# Patient Record
Sex: FEMALE | URBAN - METROPOLITAN AREA
[De-identification: names, ages, dates, MRNs, and addresses within clinical notes are randomized per-mention and may not be internally consistent; named-entity substitution may affect disease eponyms.]

---

## 2020-07-01 ENCOUNTER — CLAIMS CREATED FROM THE CLAIM WINDOW (OUTPATIENT)
Dept: URBAN - METROPOLITAN AREA CLINIC 4 | Facility: CLINIC | Age: 70
End: 2020-07-01
Payer: MEDICARE

## 2020-07-01 ENCOUNTER — OFFICE VISIT (OUTPATIENT)
Dept: URBAN - METROPOLITAN AREA SURGERY CENTER 25 | Facility: SURGERY CENTER | Age: 70
End: 2020-07-01

## 2020-07-01 DIAGNOSIS — D12.5 BENIGN NEOPLASM OF SIGMOID COLON: ICD-10-CM

## 2020-07-01 PROCEDURE — 88305 TISSUE EXAM BY PATHOLOGIST: CPT | Performed by: PATHOLOGY

## 2020-07-25 ENCOUNTER — TELEPHONE ENCOUNTER (OUTPATIENT)
Dept: URBAN - METROPOLITAN AREA CLINIC 13 | Facility: CLINIC | Age: 70
End: 2020-07-25

## 2020-07-25 RX ORDER — POLYETHYLENE GLYCOL 3350, SODIUM CHLORIDE, SODIUM BICARBONATE AND POTASSIUM CHLORIDE WITH LEMON FLAVOR 420; 11.2; 5.72; 1.48 G/4L; G/4L; G/4L; G/4L
MIX CONTENTS PER PKG DIRECTIONS. DAY PRIOR TO PROCEDURE BEGIN DRINKING 1/2 SOULTION @ 5PM, COMPLETE  SECOND 1/2 6HR PRIOR TO PROCEDURE POWDER, FOR SOLUTION ORAL
Qty: 1 | Refills: 0 | OUTPATIENT
Start: 2020-05-27 | End: 2020-07-01

## 2020-07-26 ENCOUNTER — TELEPHONE ENCOUNTER (OUTPATIENT)
Dept: URBAN - METROPOLITAN AREA CLINIC 13 | Facility: CLINIC | Age: 70
End: 2020-07-26

## 2020-07-26 RX ORDER — PRAVASTATIN SODIUM 20 MG/1
TAKE 1 TABLET DAILY TABLET ORAL
Refills: 0 | Status: ACTIVE | COMMUNITY

## 2020-07-26 RX ORDER — TOPIRAMATE 50 MG/1
TABLET, FILM COATED ORAL
Qty: 180 | Refills: 0 | Status: ACTIVE | COMMUNITY
Start: 2019-09-27

## 2020-07-26 RX ORDER — AMOXICILLIN AND CLAVULANATE POTASSIUM 875; 125 MG/1; MG/1
TABLET, FILM COATED ORAL
Qty: 14 | Refills: 0 | Status: ACTIVE | COMMUNITY
Start: 2019-12-10

## 2020-07-26 RX ORDER — SUMATRIPTAN SUCCINATE 50 MG/1
TAKE 1 TABLET AT ONSET OF MIGRAINE HEADACHE.  MAY REPEAT IN 2 HOURS IF NEEDED TABLET, FILM COATED ORAL
Refills: 0 | Status: ACTIVE | COMMUNITY

## 2020-07-26 RX ORDER — SERTRALINE 25 MG/1
TAKE 1 TABLET DAILY AS DIRECTED TABLET, FILM COATED ORAL
Refills: 0 | Status: ACTIVE | COMMUNITY
Start: 2020-06-23

## 2020-07-26 RX ORDER — BUPROPION HCL 300 MG
TAKE 1 TABLET DAILY TABLET, EXTENDED RELEASE 24 HR ORAL
Refills: 0 | Status: ACTIVE | COMMUNITY

## 2020-07-26 RX ORDER — TOPIRAMATE 50 MG/1
TABLET, FILM COATED ORAL
Qty: 180 | Refills: 0 | Status: ACTIVE | COMMUNITY
Start: 2019-03-11

## 2020-07-26 RX ORDER — ERYTHROMYCIN 5 MG/G
OINTMENT OPHTHALMIC
Qty: 4 | Refills: 0 | Status: ACTIVE | COMMUNITY
Start: 2019-11-30

## 2020-07-26 RX ORDER — ERENUMAB-AOOE 70 MG/ML
INJECTION SUBCUTANEOUS
Qty: 1 | Refills: 0 | Status: ACTIVE | COMMUNITY
Start: 2020-06-30

## 2020-07-26 RX ORDER — ERYTHROMYCIN 5 MG/G
OINTMENT OPHTHALMIC
Qty: 4 | Refills: 0 | Status: ACTIVE | COMMUNITY
Start: 2019-12-03

## 2020-07-26 RX ORDER — TRAZODONE HYDROCHLORIDE 100 MG/1
TAKE 1 TABLET DAILY AS DIRECTED TABLET, FILM COATED ORAL
Refills: 0 | Status: ACTIVE | COMMUNITY
Start: 2020-05-13

## 2020-07-26 RX ORDER — BUPROPION HYDROCHLORIDE 150 MG/1
TAKE 1 TABLET DAILY TABLET, FILM COATED, EXTENDED RELEASE ORAL
Refills: 0 | Status: ACTIVE | COMMUNITY
Start: 2020-05-13

## 2020-07-26 RX ORDER — FLUTICASONE PROPIONATE 50 UG/1
SPRAY, METERED NASAL
Qty: 16 | Refills: 0 | Status: ACTIVE | COMMUNITY
Start: 2019-12-10

## 2020-07-26 RX ORDER — TOPIRAMATE 25 MG/1
TAKE 1 TABLET BEDTIME TABLET, COATED ORAL
Refills: 0 | Status: ACTIVE | COMMUNITY

## 2020-07-26 RX ORDER — ESTRADIOL 0.1 MG/G
PLACE 2 GRAMS VAGINALLY D CREAM VAGINAL
Qty: 42 | Refills: 0 | Status: ACTIVE | COMMUNITY
Start: 2020-07-09

## 2020-07-26 RX ORDER — BENZONATATE 100 MG/1
CAPSULE ORAL
Qty: 30 | Refills: 0 | Status: ACTIVE | COMMUNITY
Start: 2019-12-10

## 2020-07-26 RX ORDER — SERTRALINE 100 MG/1
TABLET, FILM COATED ORAL
Qty: 180 | Refills: 0 | Status: ACTIVE | COMMUNITY
Start: 2020-02-11

## 2020-07-26 RX ORDER — SERTRALINE 100 MG/1
TABLET, FILM COATED ORAL
Qty: 180 | Refills: 0 | Status: ACTIVE | COMMUNITY
Start: 2019-08-16

## 2020-07-29 ENCOUNTER — OFFICE VISIT (OUTPATIENT)
Dept: URBAN - METROPOLITAN AREA CLINIC 113 | Facility: CLINIC | Age: 70
End: 2020-07-29

## 2021-10-11 ENCOUNTER — OFFICE VISIT (OUTPATIENT)
Dept: URBAN - METROPOLITAN AREA CLINIC 113 | Facility: CLINIC | Age: 71
End: 2021-10-11
Payer: MEDICARE

## 2021-10-11 VITALS
DIASTOLIC BLOOD PRESSURE: 65 MMHG | SYSTOLIC BLOOD PRESSURE: 102 MMHG | RESPIRATION RATE: 20 BRPM | HEIGHT: 66 IN | HEART RATE: 70 BPM | TEMPERATURE: 98.2 F | BODY MASS INDEX: 18.16 KG/M2 | WEIGHT: 113 LBS

## 2021-10-11 DIAGNOSIS — K86.2 PANCREATIC CYST: ICD-10-CM

## 2021-10-11 DIAGNOSIS — K86.89 PANCREATIC DUCT DILATED: ICD-10-CM

## 2021-10-11 DIAGNOSIS — K59.01 SLOW TRANSIT CONSTIPATION: ICD-10-CM

## 2021-10-11 DIAGNOSIS — R10.13 EPIGASTRIC ABDOMINAL PAIN: ICD-10-CM

## 2021-10-11 DIAGNOSIS — R63.4 WEIGHT LOSS, UNINTENTIONAL: ICD-10-CM

## 2021-10-11 PROCEDURE — 99215 OFFICE O/P EST HI 40 MIN: CPT | Performed by: NURSE PRACTITIONER

## 2021-10-11 RX ORDER — BUPROPION HYDROCHLORIDE 150 MG/1
TAKE 1 TABLET DAILY TABLET, FILM COATED, EXTENDED RELEASE ORAL
Refills: 0 | Status: ON HOLD | COMMUNITY
Start: 2020-05-13

## 2021-10-11 RX ORDER — TOPIRAMATE 25 MG/1
TAKE 1 TABLET BEDTIME TABLET, COATED ORAL
Refills: 0 | Status: ACTIVE | COMMUNITY

## 2021-10-11 RX ORDER — ERENUMAB-AOOE 70 MG/ML
INJECTION SUBCUTANEOUS
Qty: 1 | Refills: 0 | Status: ON HOLD | COMMUNITY
Start: 2020-06-30

## 2021-10-11 RX ORDER — AMOXICILLIN AND CLAVULANATE POTASSIUM 875; 125 MG/1; MG/1
TABLET, FILM COATED ORAL
Qty: 14 | Refills: 0 | Status: ON HOLD | COMMUNITY
Start: 2019-12-10

## 2021-10-11 RX ORDER — SUMATRIPTAN SUCCINATE 50 MG/1
TAKE 1 TABLET AT ONSET OF MIGRAINE HEADACHE.  MAY REPEAT IN 2 HOURS IF NEEDED TABLET, FILM COATED ORAL
Refills: 0 | Status: ACTIVE | COMMUNITY

## 2021-10-11 RX ORDER — BENZONATATE 100 MG/1
CAPSULE ORAL
Qty: 30 | Refills: 0 | Status: ON HOLD | COMMUNITY
Start: 2019-12-10

## 2021-10-11 RX ORDER — PRAVASTATIN SODIUM 20 MG/1
TAKE 1 TABLET DAILY TABLET ORAL
Refills: 0 | Status: ACTIVE | COMMUNITY

## 2021-10-11 RX ORDER — FLUTICASONE PROPIONATE 50 UG/1
SPRAY, METERED NASAL
Qty: 16 | Refills: 0 | Status: ACTIVE | COMMUNITY
Start: 2019-12-10

## 2021-10-11 RX ORDER — TOPIRAMATE 100 MG/1
1 TABLET TABLET, FILM COATED ORAL ONCE A DAY
Refills: 0 | Status: ACTIVE | COMMUNITY
Start: 2019-03-11

## 2021-10-11 RX ORDER — TRAZODONE HYDROCHLORIDE 100 MG/1
TAKE 1 TABLET DAILY AS DIRECTED TABLET, FILM COATED ORAL
Refills: 0 | Status: ACTIVE | COMMUNITY
Start: 2020-05-13

## 2021-10-11 RX ORDER — SERTRALINE 25 MG/1
TAKE 1 TABLET DAILY AS DIRECTED TABLET, FILM COATED ORAL
Refills: 0 | Status: ON HOLD | COMMUNITY
Start: 2020-06-23

## 2021-10-11 RX ORDER — ESTRADIOL 0.1 MG/G
PLACE 2 GRAMS VAGINALLY D CREAM VAGINAL
Qty: 42 | Refills: 0 | Status: ON HOLD | COMMUNITY
Start: 2020-07-09

## 2021-10-11 RX ORDER — ERYTHROMYCIN 5 MG/G
OINTMENT OPHTHALMIC
Qty: 4 | Refills: 0 | Status: ON HOLD | COMMUNITY
Start: 2019-11-30

## 2021-10-11 RX ORDER — DENOSUMAB 60 MG/ML
AS DIRECTED INJECTION SUBCUTANEOUS
Status: ACTIVE | COMMUNITY

## 2021-10-11 RX ORDER — BUPROPION HCL 300 MG
TAKE 1 TABLET DAILY TABLET, EXTENDED RELEASE 24 HR ORAL
Refills: 0 | Status: ACTIVE | COMMUNITY

## 2021-10-11 RX ORDER — SERTRALINE 100 MG/1
TABLET, FILM COATED ORAL
Qty: 180 | Refills: 0 | Status: ON HOLD | COMMUNITY
Start: 2019-08-16

## 2021-10-11 NOTE — HPI-OTHER HISTORIES
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 Mild dilatation of the main pancreatic duct is stable. Labs 6/3/2021:Normal CBC, CMP, amylase, lipase, ferritin, iron sat.  Negative hepatitis A antibody IgM, hepatitis B surface antigen, hepatitis B core antibody IgM, hepatitis C antibody.  Negative serum H. pylori. CT of the abdomen and pelvis with contrast 5/28/2021:Circumferential bladder wall thickening, a 5 mm cystic lesion within the uncinate process which most likely represents a sidebranch IPMN.  The main pancreatic duct measures up to 7 mm. Colonoscopy 7/1/2020:Diverticulosis in the sigmoid and descending colon and a 7 mm hyperplastic polyp at the rectosigmoid colon.

## 2021-10-11 NOTE — HPI-TODAY'S VISIT:
71-year-old female with a history of hyperlipidemia, anxiety and depression, presenting for evaluation of abdominal pain, weight loss, and abnormal imaging findings.  She reports a 4-6 month history of intermittent, sharp postprandial epigastric pain. She complains of associated nausea without vomiting. Her reflux is controlled with low-dose acid suppression, but she cannot recall the name of the medication. She has constipation at baseline, going up to 5 days without a stool. She has tried prunes and occasional use of MiraLAX, without change. She reports a 20lb unintentional weight loss within the last 6-12 months, with associated early satiety. She had labs and imaging with her PCP, as outlined below. She reports infrequent NSAID use and occasional alcohol use, but denies tobacco use.  There is no family history of GI malignancy.

## 2021-10-12 PROBLEM — 79922009 EPIGASTRIC PAIN: Status: ACTIVE | Noted: 2021-10-12

## 2021-10-12 PROBLEM — 235493004 DILATATION OF PANCREATIC DUCT: Status: ACTIVE | Noted: 2021-10-12

## 2021-10-12 LAB
A/G RATIO: 2.4
ALBUMIN: 4.5
ALKALINE PHOSPHATASE: 35
ALT (SGPT): 16
AST (SGOT): 17
BASO (ABSOLUTE): 0
BASOS: 1
BILIRUBIN, TOTAL: 0.4
BUN/CREATININE RATIO: 22
BUN: 19
CA 19-9: 10
CALCIUM: 9.2
CARBON DIOXIDE, TOTAL: 23
CHLORIDE: 110
CREATININE: 0.86
EGFR IF AFRICN AM: 79
EGFR IF NONAFRICN AM: 69
EOS (ABSOLUTE): 0.1
EOS: 3
GLOBULIN, TOTAL: 1.9
GLUCOSE: 94
HEMATOCRIT: 41.8
HEMATOLOGY COMMENTS:: (no result)
HEMOGLOBIN: 13.2
IMMATURE CELLS: (no result)
IMMATURE GRANS (ABS): 0
IMMATURE GRANULOCYTES: 0
LYMPHS (ABSOLUTE): 1.1
LYMPHS: 35
MCH: 28.8
MCHC: 31.6
MCV: 91
MONOCYTES(ABSOLUTE): 0.3
MONOCYTES: 8
NEUTROPHILS (ABSOLUTE): 1.7
NEUTROPHILS: 53
NRBC: (no result)
PLATELETS: 167
POTASSIUM: 4.1
PROTEIN, TOTAL: 6.4
RBC: 4.59
RDW: 12.4
SODIUM: 145
WBC: 3.3

## 2021-11-10 ENCOUNTER — OFFICE VISIT (OUTPATIENT)
Dept: URBAN - METROPOLITAN AREA MEDICAL CENTER 19 | Facility: MEDICAL CENTER | Age: 71
End: 2021-11-10
Payer: MEDICARE

## 2021-11-10 ENCOUNTER — LAB OUTSIDE AN ENCOUNTER (OUTPATIENT)
Dept: URBAN - METROPOLITAN AREA CLINIC 113 | Facility: CLINIC | Age: 71
End: 2021-11-10

## 2021-11-10 ENCOUNTER — TELEPHONE ENCOUNTER (OUTPATIENT)
Dept: URBAN - METROPOLITAN AREA CLINIC 113 | Facility: CLINIC | Age: 71
End: 2021-11-10

## 2021-11-10 DIAGNOSIS — K44.9 DIAPHRAGMATIC HERNIA WITHOUT OBSTRUCTION AND WITHOUT GANGRENE: ICD-10-CM

## 2021-11-10 DIAGNOSIS — R93.3 ABNORMAL BARIUM ENEMA: ICD-10-CM

## 2021-11-10 DIAGNOSIS — K22.89 ESOPHAGEAL THICKENING: ICD-10-CM

## 2021-11-10 DIAGNOSIS — K86.89 ATROPHY, PANCREAS: ICD-10-CM

## 2021-11-10 DIAGNOSIS — K86.2 CYST OF PANCREAS: ICD-10-CM

## 2021-11-10 PROCEDURE — 43259 EGD US EXAM DUODENUM/JEJUNUM: CPT | Performed by: INTERNAL MEDICINE

## 2021-11-10 PROCEDURE — 43239 EGD BIOPSY SINGLE/MULTIPLE: CPT | Performed by: INTERNAL MEDICINE

## 2021-11-10 RX ORDER — TRAZODONE HYDROCHLORIDE 100 MG/1
TAKE 1 TABLET DAILY AS DIRECTED TABLET, FILM COATED ORAL
Refills: 0 | Status: ACTIVE | COMMUNITY
Start: 2020-05-13

## 2021-11-10 RX ORDER — ERYTHROMYCIN 5 MG/G
OINTMENT OPHTHALMIC
Qty: 4 | Refills: 0 | Status: ON HOLD | COMMUNITY
Start: 2019-11-30

## 2021-11-10 RX ORDER — PRAVASTATIN SODIUM 20 MG/1
TAKE 1 TABLET DAILY TABLET ORAL
Refills: 0 | Status: ACTIVE | COMMUNITY

## 2021-11-10 RX ORDER — BUPROPION HYDROCHLORIDE 150 MG/1
TAKE 1 TABLET DAILY TABLET, FILM COATED, EXTENDED RELEASE ORAL
Refills: 0 | Status: ON HOLD | COMMUNITY
Start: 2020-05-13

## 2021-11-10 RX ORDER — TOPIRAMATE 25 MG/1
TAKE 1 TABLET BEDTIME TABLET, COATED ORAL
Refills: 0 | Status: ACTIVE | COMMUNITY

## 2021-11-10 RX ORDER — FLUTICASONE PROPIONATE 50 UG/1
SPRAY, METERED NASAL
Qty: 16 | Refills: 0 | Status: ACTIVE | COMMUNITY
Start: 2019-12-10

## 2021-11-10 RX ORDER — BENZONATATE 100 MG/1
CAPSULE ORAL
Qty: 30 | Refills: 0 | Status: ON HOLD | COMMUNITY
Start: 2019-12-10

## 2021-11-10 RX ORDER — BUPROPION HCL 300 MG
TAKE 1 TABLET DAILY TABLET, EXTENDED RELEASE 24 HR ORAL
Refills: 0 | Status: ACTIVE | COMMUNITY

## 2021-11-10 RX ORDER — AMOXICILLIN AND CLAVULANATE POTASSIUM 875; 125 MG/1; MG/1
TABLET, FILM COATED ORAL
Qty: 14 | Refills: 0 | Status: ON HOLD | COMMUNITY
Start: 2019-12-10

## 2021-11-10 RX ORDER — ERENUMAB-AOOE 70 MG/ML
INJECTION SUBCUTANEOUS
Qty: 1 | Refills: 0 | Status: ON HOLD | COMMUNITY
Start: 2020-06-30

## 2021-11-10 RX ORDER — TOPIRAMATE 100 MG/1
1 TABLET TABLET, FILM COATED ORAL ONCE A DAY
Refills: 0 | Status: ACTIVE | COMMUNITY
Start: 2019-03-11

## 2021-11-10 RX ORDER — SUMATRIPTAN SUCCINATE 50 MG/1
TAKE 1 TABLET AT ONSET OF MIGRAINE HEADACHE.  MAY REPEAT IN 2 HOURS IF NEEDED TABLET, FILM COATED ORAL
Refills: 0 | Status: ACTIVE | COMMUNITY

## 2021-11-10 RX ORDER — DENOSUMAB 60 MG/ML
AS DIRECTED INJECTION SUBCUTANEOUS
Status: ACTIVE | COMMUNITY

## 2021-11-10 RX ORDER — ESTRADIOL 0.1 MG/G
PLACE 2 GRAMS VAGINALLY D CREAM VAGINAL
Qty: 42 | Refills: 0 | Status: ON HOLD | COMMUNITY
Start: 2020-07-09

## 2021-11-10 RX ORDER — SERTRALINE 100 MG/1
TABLET, FILM COATED ORAL
Qty: 180 | Refills: 0 | Status: ON HOLD | COMMUNITY
Start: 2019-08-16

## 2021-11-10 RX ORDER — SERTRALINE 25 MG/1
TAKE 1 TABLET DAILY AS DIRECTED TABLET, FILM COATED ORAL
Refills: 0 | Status: ON HOLD | COMMUNITY
Start: 2020-06-23

## 2021-12-02 ENCOUNTER — OFFICE VISIT (OUTPATIENT)
Dept: URBAN - METROPOLITAN AREA CLINIC 113 | Facility: CLINIC | Age: 71
End: 2021-12-02
Payer: MEDICARE

## 2021-12-02 ENCOUNTER — DASHBOARD ENCOUNTERS (OUTPATIENT)
Age: 71
End: 2021-12-02

## 2021-12-02 VITALS
TEMPERATURE: 98.6 F | DIASTOLIC BLOOD PRESSURE: 65 MMHG | WEIGHT: 114 LBS | SYSTOLIC BLOOD PRESSURE: 102 MMHG | HEIGHT: 66 IN | BODY MASS INDEX: 18.32 KG/M2 | RESPIRATION RATE: 18 BRPM | HEART RATE: 72 BPM

## 2021-12-02 DIAGNOSIS — K86.2 PANCREATIC CYST: ICD-10-CM

## 2021-12-02 DIAGNOSIS — K62.89 PROCTALGIA: ICD-10-CM

## 2021-12-02 DIAGNOSIS — K59.01 SLOW TRANSIT CONSTIPATION: ICD-10-CM

## 2021-12-02 DIAGNOSIS — R63.4 WEIGHT LOSS, UNINTENTIONAL: ICD-10-CM

## 2021-12-02 PROBLEM — 35298007 SLOW TRANSIT CONSTIPATION: Status: ACTIVE | Noted: 2021-10-11

## 2021-12-02 PROBLEM — 262285001 WEIGHT DECREASED: Status: ACTIVE | Noted: 2021-10-12

## 2021-12-02 PROCEDURE — 99214 OFFICE O/P EST MOD 30 MIN: CPT | Performed by: NURSE PRACTITIONER

## 2021-12-02 RX ORDER — TOPIRAMATE 100 MG/1
1 TABLET TABLET, FILM COATED ORAL ONCE A DAY
Refills: 0 | Status: ACTIVE | COMMUNITY
Start: 2019-03-11

## 2021-12-02 RX ORDER — DENOSUMAB 60 MG/ML
AS DIRECTED INJECTION SUBCUTANEOUS
Status: ACTIVE | COMMUNITY

## 2021-12-02 RX ORDER — SERTRALINE 100 MG/1
TABLET, FILM COATED ORAL
Qty: 180 | Refills: 0 | Status: ON HOLD | COMMUNITY
Start: 2019-08-16

## 2021-12-02 RX ORDER — ESTRADIOL 0.1 MG/G
PLACE 2 GRAMS VAGINALLY D CREAM VAGINAL
Qty: 42 | Refills: 0 | Status: ON HOLD | COMMUNITY
Start: 2020-07-09

## 2021-12-02 RX ORDER — FLUTICASONE PROPIONATE 50 UG/1
SPRAY, METERED NASAL
Qty: 16 | Refills: 0 | Status: ACTIVE | COMMUNITY
Start: 2019-12-10

## 2021-12-02 RX ORDER — BUPROPION HYDROCHLORIDE 150 MG/1
TAKE 1 TABLET DAILY TABLET, FILM COATED, EXTENDED RELEASE ORAL
Refills: 0 | Status: ON HOLD | COMMUNITY
Start: 2020-05-13

## 2021-12-02 RX ORDER — ERYTHROMYCIN 5 MG/G
OINTMENT OPHTHALMIC
Qty: 4 | Refills: 0 | Status: ON HOLD | COMMUNITY
Start: 2019-11-30

## 2021-12-02 RX ORDER — TOPIRAMATE 25 MG/1
TAKE 1 TABLET BEDTIME TABLET, COATED ORAL
Refills: 0 | Status: ACTIVE | COMMUNITY

## 2021-12-02 RX ORDER — SERTRALINE 25 MG/1
TAKE 1 TABLET DAILY AS DIRECTED TABLET, FILM COATED ORAL
Refills: 0 | Status: ON HOLD | COMMUNITY
Start: 2020-06-23

## 2021-12-02 RX ORDER — BUPROPION HCL 300 MG
TAKE 1 TABLET DAILY TABLET, EXTENDED RELEASE 24 HR ORAL
Refills: 0 | Status: ACTIVE | COMMUNITY

## 2021-12-02 RX ORDER — PRAVASTATIN SODIUM 20 MG/1
TAKE 1 TABLET DAILY TABLET ORAL
Refills: 0 | Status: ACTIVE | COMMUNITY

## 2021-12-02 RX ORDER — TRAZODONE HYDROCHLORIDE 100 MG/1
TAKE 1 TABLET DAILY AS DIRECTED TABLET, FILM COATED ORAL
Refills: 0 | Status: ACTIVE | COMMUNITY
Start: 2020-05-13

## 2021-12-02 RX ORDER — SUMATRIPTAN SUCCINATE 50 MG/1
TAKE 1 TABLET AT ONSET OF MIGRAINE HEADACHE.  MAY REPEAT IN 2 HOURS IF NEEDED TABLET, FILM COATED ORAL
Refills: 0 | Status: ACTIVE | COMMUNITY

## 2021-12-02 RX ORDER — ERENUMAB-AOOE 70 MG/ML
INJECTION SUBCUTANEOUS
Qty: 1 | Refills: 0 | Status: ON HOLD | COMMUNITY
Start: 2020-06-30

## 2021-12-02 RX ORDER — HYDROCORTISONE 25 MG/G
1 APPLICATION CREAM TOPICAL TWICE A DAY
Qty: 28 GRAMS | Refills: 1 | OUTPATIENT
Start: 2021-12-02 | End: 2022-01-31

## 2021-12-02 RX ORDER — LINACLOTIDE 72 UG/1
1 CAPSULE AT LEAST 30 MINUTES BEFORE THE FIRST MEAL OF THE DAY ON AN EMPTY STOMACH CAPSULE, GELATIN COATED ORAL ONCE A DAY
Qty: 90 | Refills: 3 | OUTPATIENT
Start: 2021-12-02 | End: 2022-11-27

## 2021-12-02 RX ORDER — AMOXICILLIN AND CLAVULANATE POTASSIUM 875; 125 MG/1; MG/1
TABLET, FILM COATED ORAL
Qty: 14 | Refills: 0 | Status: ON HOLD | COMMUNITY
Start: 2019-12-10

## 2021-12-02 RX ORDER — BENZONATATE 100 MG/1
CAPSULE ORAL
Qty: 30 | Refills: 0 | Status: ON HOLD | COMMUNITY
Start: 2019-12-10

## 2021-12-02 NOTE — HPI-TODAY'S VISIT:
71-year-old female with a history of hyperlipidemia, anxiety and depression, presenting for follow-up after EUS.  She was seen in the office 10/11/21 for evaluation of postprandial epigastric pain, early satiety, and unintentional weight loss, with CT and MRI/MRCP revealing a pancreatic cyst and mild dilatation of the pancreatic duct,  suspected to reflect a sidebranch IPMN. Labs and an EGD with EUS was planned. Regarding GERD, she was to continue her current regimen with PPI. She was recommended a daily bowel regimen with MiraLAX and MOM for constipation.  EUS 11/10/21: a 6mm pancreatic duct within the pancreatic head, tiny 3mm pancreatic head cyst with probable duct communication c/w IPMN, normal pancreatic duct, small hiatal hernia, irregular z-line at 40cm (biopsies negative), patulous lower esophageal sphincter. A CT of the chest was recommended to further evaluate her weight loss, with plan for repeat MRI/MRCP in January then every 6 months thereafter for surveillance.  Fortunately, her abdominal pain and nausea have subsided. Her appetite has improved and she is able to eat small meals. Her weight has increased 1lb since the time of her last visit. She did not proceed with a CT of the chest, because she was unclear of the reasoning for the additional imaging. She was not able to tolerate nutritional shakes. She continues to struggle with constipation, but admits she has been inconsistent with MiraLAX. She is only having a bowel movement every 5-6 days. She awoke this morning with rectal pain and a protrusion to the rectal area, concerning for a hemorrhoid. She denies rectal bleeding. Of note, milk of magnesia was effective but did result in diarrhea.

## 2022-01-03 ENCOUNTER — TELEPHONE ENCOUNTER (OUTPATIENT)
Dept: URBAN - METROPOLITAN AREA CLINIC 113 | Facility: CLINIC | Age: 72
End: 2022-01-03

## 2022-01-04 PROBLEM — 31258000 PANCREATIC CYST: Status: ACTIVE | Noted: 2021-10-12

## 2024-08-21 ENCOUNTER — LAB OUTSIDE AN ENCOUNTER (OUTPATIENT)
Dept: URBAN - METROPOLITAN AREA SURGERY CENTER 25 | Facility: SURGERY CENTER | Age: 74
End: 2024-08-21

## 2024-08-22 ENCOUNTER — OFFICE VISIT (OUTPATIENT)
Dept: URBAN - METROPOLITAN AREA SURGERY CENTER 25 | Facility: SURGERY CENTER | Age: 74
End: 2024-08-22
Payer: MEDICARE

## 2024-08-22 DIAGNOSIS — Z86.010 PERSONAL HISTORY OF COLONIC POLYPS: ICD-10-CM

## 2024-08-22 DIAGNOSIS — Z09 ENCNTR FOR F/U EXAM AFT TRTMT FOR COND OTH THAN MALIG NEOPLM: ICD-10-CM

## 2024-08-22 DIAGNOSIS — K57.30 COLON, DIVERTICULOSIS: ICD-10-CM

## 2024-08-22 DIAGNOSIS — Z09 ENCOUNTER FOR FOLLOW-UP EXAMINATION AFTER COMPLETED TREATMENT FOR CONDITIONS OTHER THAN MALIGNANT NEOPLASM: ICD-10-CM

## 2024-08-22 DIAGNOSIS — Z86.010 ADENOMAS PERSONAL HISTORY OF COLONIC POLYPS: ICD-10-CM

## 2024-08-22 PROCEDURE — G0105 COLORECTAL SCRN; HI RISK IND: HCPCS | Performed by: INTERNAL MEDICINE

## 2024-08-22 PROCEDURE — 00811 ANES LWR INTST NDSC NOS: CPT | Performed by: ANESTHESIOLOGY

## 2024-08-22 PROCEDURE — 0529F INTRVL 3/>YR PTS CLNSCP DOCD: CPT | Performed by: CLINIC/CENTER

## 2024-08-22 PROCEDURE — G0105 COLORECTAL SCRN; HI RISK IND: HCPCS | Performed by: CLINIC/CENTER

## 2024-08-22 PROCEDURE — 0529F INTRVL 3/>YR PTS CLNSCP DOCD: CPT | Performed by: INTERNAL MEDICINE

## 2024-08-22 RX ORDER — TOPIRAMATE 25 MG/1
TAKE 1 TABLET BEDTIME TABLET, COATED ORAL
Refills: 0 | Status: ACTIVE | COMMUNITY

## 2024-08-22 RX ORDER — BENZONATATE 100 MG/1
CAPSULE ORAL
Qty: 30 | Refills: 0 | Status: ON HOLD | COMMUNITY
Start: 2019-12-10

## 2024-08-22 RX ORDER — FLUTICASONE PROPIONATE 50 UG/1
SPRAY, METERED NASAL
Qty: 16 | Refills: 0 | Status: ACTIVE | COMMUNITY
Start: 2019-12-10

## 2024-08-22 RX ORDER — ERYTHROMYCIN 5 MG/G
OINTMENT OPHTHALMIC
Qty: 4 | Refills: 0 | Status: ON HOLD | COMMUNITY
Start: 2019-11-30

## 2024-08-22 RX ORDER — ESTRADIOL 0.1 MG/G
PLACE 2 GRAMS VAGINALLY D CREAM VAGINAL
Qty: 42 | Refills: 0 | Status: ON HOLD | COMMUNITY
Start: 2020-07-09

## 2024-08-22 RX ORDER — BUPROPION HCL 300 MG
TAKE 1 TABLET DAILY TABLET, EXTENDED RELEASE 24 HR ORAL
Refills: 0 | Status: ACTIVE | COMMUNITY

## 2024-08-22 RX ORDER — TRAZODONE HYDROCHLORIDE 100 MG/1
TAKE 1 TABLET DAILY AS DIRECTED TABLET ORAL
Refills: 0 | Status: ACTIVE | COMMUNITY
Start: 2020-05-13

## 2024-08-22 RX ORDER — DENOSUMAB 60 MG/ML
AS DIRECTED INJECTION SUBCUTANEOUS
Status: ACTIVE | COMMUNITY

## 2024-08-22 RX ORDER — TOPIRAMATE 100 MG/1
1 TABLET TABLET, FILM COATED ORAL ONCE A DAY
Refills: 0 | Status: ACTIVE | COMMUNITY
Start: 2019-03-11

## 2024-08-22 RX ORDER — SUMATRIPTAN SUCCINATE 50 MG/1
TAKE 1 TABLET AT ONSET OF MIGRAINE HEADACHE.  MAY REPEAT IN 2 HOURS IF NEEDED TABLET, FILM COATED ORAL
Refills: 0 | Status: ACTIVE | COMMUNITY

## 2024-08-22 RX ORDER — SERTRALINE 100 MG/1
TABLET, FILM COATED ORAL
Qty: 180 | Refills: 0 | Status: ON HOLD | COMMUNITY
Start: 2019-08-16

## 2024-08-22 RX ORDER — SERTRALINE 25 MG/1
TAKE 1 TABLET DAILY AS DIRECTED TABLET, FILM COATED ORAL
Refills: 0 | Status: ON HOLD | COMMUNITY
Start: 2020-06-23

## 2024-08-22 RX ORDER — ERENUMAB-AOOE 70 MG/ML
INJECTION SUBCUTANEOUS
Qty: 1 | Refills: 0 | Status: ON HOLD | COMMUNITY
Start: 2020-06-30

## 2024-08-22 RX ORDER — BUPROPION HYDROCHLORIDE 150 MG/1
TAKE 1 TABLET DAILY TABLET, FILM COATED, EXTENDED RELEASE ORAL
Refills: 0 | Status: ON HOLD | COMMUNITY
Start: 2020-05-13

## 2024-08-22 RX ORDER — PRAVASTATIN SODIUM 20 MG/1
TAKE 1 TABLET DAILY TABLET ORAL
Refills: 0 | Status: ACTIVE | COMMUNITY

## 2024-08-22 RX ORDER — AMOXICILLIN AND CLAVULANATE POTASSIUM 875; 125 MG/1; MG/1
TABLET, FILM COATED ORAL
Qty: 14 | Refills: 0 | Status: ON HOLD | COMMUNITY
Start: 2019-12-10